# Patient Record
Sex: MALE | Race: WHITE | Employment: OTHER | URBAN - METROPOLITAN AREA
[De-identification: names, ages, dates, MRNs, and addresses within clinical notes are randomized per-mention and may not be internally consistent; named-entity substitution may affect disease eponyms.]

---

## 2019-12-03 ENCOUNTER — HOSPITAL ENCOUNTER (OUTPATIENT)
Dept: PHYSICAL THERAPY | Age: 49
Discharge: HOME | End: 2019-12-03
Payer: COMMERCIAL

## 2019-12-03 PROCEDURE — 97162 PT EVAL MOD COMPLEX 30 MIN: CPT

## 2019-12-03 PROCEDURE — 97110 THERAPEUTIC EXERCISES: CPT

## 2019-12-03 NOTE — PROGRESS NOTES
PHYSICAL THERAPY - DAILY TREATMENT NOTE    Patient Name: Jim Carnes        Date: 12/3/2019  : 1970   YES Patient  Verified  Visit #:   1   of   8  Insurance: Payor: Ruby Christian / Plan: [place] [employer] / Product Type: PPO /      In time: 1240 Out time: 130   Total Treatment Time: 50     BCBS/Medicare Time Tracking (below)   Total Timed Codes (min):  NA 1:1 Treatment Time:  NA     TREATMENT AREA =  Pain in right knee [M25.561]    SUBJECTIVE  Pain Level (on 0 to 10 scale):  0.5  / 10   Medication Changes/New allergies or changes in medical history, any new surgeries or procedures?     NO    If yes, update Summary List   Subjective Functional Status/Changes:  []  No changes reported     See POC            Modalities Rationale:     decrease inflammation and decrease pain to improve patient's ability to perform pain free ADLs    min [] Estim, type/location:                                      []  att     []  unatt     []  w/US     []  w/ice    []  w/heat    min []  Mechanical Traction: type/lbs                   []  pro   []  sup   []  int   []  cont    []  before manual    []  after manual    min []  Ultrasound, settings/location:      min []  Iontophoresis w/ dexamethasone, location:                                               []  take home patch       []  in clinic   10 min [x]  Ice     []  Heat    location/position: R knee in supine    min []  Vasopneumatic Device, press/temp:     min []  Other:    [x] Skin assessment post-treatment (if applicable):    [x]  intact    [x]  redness- no adverse reaction     []redness  adverse reaction:        10 min Therapeutic Exercise:  [x]  See flow sheet   Rationale:      increase ROM, increase strength, improve coordination, improve balance and increase proprioception to improve the patients ability to perform unlimited ambulation       Billed With/As:   [] TE   [] TA   [] Neuro   [] Self Care Patient Education: [x] Review HEP    [] Progressed/Changed HEP based on: [] positioning   [] body mechanics   [] transfers   [] heat/ice application    [] other:      Other Objective/Functional Measures:    See POC      Post Treatment Pain Level (on 0 to 10) scale:   0  / 10     ASSESSMENT  Assessment/Changes in Function:     See POC      []  See Progress Note/Recertification   Patient will continue to benefit from skilled PT services to modify and progress therapeutic interventions, address functional mobility deficits, address ROM deficits, address strength deficits, analyze and address soft tissue restrictions, analyze and cue movement patterns, analyze and modify body mechanics/ergonomics, assess and modify postural abnormalities, address imbalance/dizziness and instruct in home and community integration to attain remaining goals.    Progress toward goals / Updated goals:    See POC      PLAN  [x]  Upgrade activities as tolerated YES Continue plan of care   []  Discharge due to :    []  Other:      Therapist: Felicitas Spencer    Date: 12/3/2019 Time: 2:21 PM     Future Appointments   Date Time Provider Mgadalena Mena   12/5/2019  5:00 PM [facility] [place]   12/13/2019 10:00 AM [facility]

## 2019-12-03 NOTE — PROGRESS NOTES
[address]  [facility] AT [facility] [address] [place] [address], [address] [place], [place] [address]St ,[address], [address] [facility]  Phone: [phone]  Fax: [phone] / 07 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 Comorbidities: L THR, c/s fusion, arthritis   Medications: Verified on Patient Summary List   The Plan of Care and following information is based on the information from the initial evaluation.   ===========================================================================================  Assessment / key information:  Patient is a 52year old male presenting to [facility] with a dx of R knee pain. Patient reports having arthroscopic knee surgery on 19 where he reports \"cleaning out\" of the knee joint. He used crutches for 2-3 days and has since been ambulating without an AD. Pain ranges from a 1/2 to 7/10. He states having some instability in his right knee while walking or making quick turns but describes the feel as if his knee is going to snap backward. Today upon objective examination the following was found: 1) poor control of terminal extension during gait with foot flat gait no use of toe rockers. 2) ROM of the R knee 2-120 degrees 3) decreased inferior and superior patellar mobility 4) fair quadset, improved with practice. Quick fatigue during SLR with visible shaking throughout. 5) quad strength: 4-/5, hamstrings 4-/5, hip abduction 3+/5. LLE 4+/5 throughout with exception of L hip abduction 4-/5.   Patient sx are consistent with R knee pain s/p arthroscopic surgery. Patient would benefit from skilled PT services in order to increase strength, range of motion, balance, propriopcetion, coordination in order to improve ease with ADLS and functional mobility.   ===========================================================================================  Eval Complexity: History HIGH Complexity :3+ comorbidities / personal factors will impact the outcome/ POC ;  Examination  MEDIUM Complexity : 3 Standardized tests and measures addressing body structure, function, activity limitation and / or participation in recreation ; Presentation MEDIUM Complexity : Evolving with changing characteristics ; Decision Making MEDIUM Complexity : FOTO score of 26-74; Overall Complexity MEDIUM  Problem List: pain affecting function, decrease ROM, decrease strength, edema affecting function, impaired gait/ balance, decrease ADL/ functional abilitiies, decrease activity tolerance, decrease flexibility/ joint mobility and decrease transfer abilities   Treatment Plan may include any combination of the following: Therapeutic exercise, Therapeutic activities, Neuromuscular re-education, Physical agent/modality, Gait/balance training, Manual therapy, Aquatic therapy, Patient education, Self Care training, Functional mobility training, Home safety training and Stair training  Patient / Family readiness to learn indicated by: asking questions, trying to perform skills and interest  Persons(s) to be included in education: patient (P)  Barriers to Learning/Limitations: None  Measures taken, if barriers to learning:    Patient Goal (s): \"increased ROM\"    Patient self reported health status: good  Rehabilitation Potential: good   Short Term Goals: To be accomplished in  2  weeks  1) PAtient will have established HEP in order to prevent further pain and disability. 2) Patient will ambulate with normalized gait pattern in order to prevent further pain.  Long Term Goals:  To be accomplished in  4 weeks:   1) Patient will be I and compliant with a progressive, high level HEP in order to maintain gains made in physical therapy. 2) Patient will maintain SLS for 30s on foam in order to allow for resuming recreational jiu-jitsu. 3) Patient will increase strength of the RLE to 4+/5 in order to allow for ease with ADLs. 4) PAtient will increase FOTO Score to >/= 65 in oorder to allow for ease with putting on shoes or socks. Frequency / Duration:   Patient to be seen  2  times per week for 6  weeks:  Patient / Caregiver education and instruction: self care, activity modification and exercises  Therapist Signature: Susannah Le PT DPT  Date: 95/6/5203   Certification Period: NA Time: 2:05 PM   ===========================================================================================  I certify that the above Physical Therapy Services are being furnished while the patient is under my care. I agree with the treatment plan and certify that this therapy is necessary. Physician Signature:        Date:       Time:     Please sign and return to [facility] or you may fax the signed copy to [phone]. Thank you.

## 2019-12-13 ENCOUNTER — HOSPITAL ENCOUNTER (OUTPATIENT)
Dept: PHYSICAL THERAPY | Age: 49
Discharge: HOME | End: 2019-12-13
Payer: COMMERCIAL

## 2019-12-13 PROCEDURE — 97110 THERAPEUTIC EXERCISES: CPT

## 2019-12-13 NOTE — PROGRESS NOTES
PHYSICAL THERAPY - DAILY TREATMENT NOTE    Patient Name: Arthur Rodriguez        Date: 2019  : 1970   YES Patient  Verified  Visit #:   2   of   4  Insurance: Payor: Greg Headley / Plan: [place] [employer] PPO / Product Type: PPO /      In time: 1000 Out time: 1050   Total Treatment Time: 50     BCBS/Medicare Time Tracking (below)   Total Timed Codes (min):  NA 1:1 Treatment Time:  NA     TREATMENT AREA =  Pain in right knee [M25.561]    SUBJECTIVE  Pain Level (on 0 to 10 scale):  2  / 10   Medication Changes/New allergies or changes in medical history, any new surgeries or procedures? NO    If yes, update Summary List   Subjective Functional Status/Changes:  []  No changes reported     Patient reports some tightness throughout his knee but has been doing his exercises.             Modalities Rationale:     decrease inflammation and decrease pain to improve patient's ability to perform pain free transfers   min [] Estim, type/location:                                      []  att     []  unatt     []  w/US     []  w/ice    []  w/heat    min []  Mechanical Traction: type/lbs                   []  pro   []  sup   []  int   []  cont    []  before manual    []  after manual    min []  Ultrasound, settings/location:      min []  Iontophoresis w/ dexamethasone, location:                                               []  take home patch       []  in clinic   10 min [x]  Ice     []  Heat    location/position:  R knee in supine    min []  Vasopneumatic Device, press/temp:     min []  Other:    [x] Skin assessment post-treatment (if applicable):    [x]  intact    [x]  redness- no adverse reaction     []redness  adverse reaction:        40 min Therapeutic Exercise:  [x]  See flow sheet   Rationale:      increase ROM, increase strength, improve coordination, improve balance and increase proprioception to improve the patients ability to perform unlimited ADLs          Billed With/As:   [] TE   [] TA   [] Neuro   [] Self Care Patient Education: [x] Review HEP    [] Progressed/Changed HEP based on:   [] positioning   [] body mechanics   [] transfers   [] heat/ice application    [] other:      Other Objective/Functional Measures:    Initiated exercises per flow sheet      Post Treatment Pain Level (on 0 to 10) scale:   0  / 10     ASSESSMENT  Assessment/Changes in Function:     Visible shaking and quick to fatigue with eccentric control during lateral step down.     []  See Progress Note/Recertification   Patient will continue to benefit from skilled PT services to modify and progress therapeutic interventions, address functional mobility deficits, address ROM deficits, address strength deficits, analyze and address soft tissue restrictions, analyze and cue movement patterns, analyze and modify body mechanics/ergonomics, assess and modify postural abnormalities, address imbalance/dizziness and instruct in home and community integration to attain remaining goals. Progress toward goals / Updated goals:    Progressing towards LTG 2.       PLAN  [x]  Upgrade activities as tolerated YES Continue plan of care   []  Discharge due to :    []  Other:      Therapist: Basia Hollingsworth    Date: 12/13/2019 Time: 12:27 PM     Future Appointments   Date Time Provider Magdalena Mena   12/20/2019  9:30 AM [facility] [place]

## 2019-12-20 ENCOUNTER — APPOINTMENT (OUTPATIENT)
Dept: PHYSICAL THERAPY | Age: 49
End: 2019-12-20
Payer: COMMERCIAL

## 2020-01-31 NOTE — PROGRESS NOTES
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 Marissa Bui was last seen on 19 for one f/u appointment after initial evaluation. He had overall scheduling conflicts and did not schedule further f/u appointments. Current status is unknown and reassessment towards goals is unable to be made. Assessments/Recommendations: Discontinue therapy. Progressing towards or have reached established goals. If you have any questions/comments please contact us directly at [phone]. Thank you for allowing us to assist in the care of your patient.     Therapist Signature: Alex Hernandez PT DPT  Date: 2020   Reporting Period: NA  Time: 9:17 AM

## 2023-07-25 ENCOUNTER — HOSPITAL ENCOUNTER (OUTPATIENT)
Facility: HOSPITAL | Age: 53
Discharge: HOME | End: 2023-07-28
Attending: FAMILY MEDICINE

## 2023-07-25 DIAGNOSIS — E78.5 HYPERLIPIDEMIA, UNSPECIFIED HYPERLIPIDEMIA TYPE: ICD-10-CM

## 2023-07-25 PROCEDURE — 75571 CT HRT W/O DYE W/CA TEST: CPT
